# Patient Record
Sex: MALE | Employment: FULL TIME | URBAN - METROPOLITAN AREA
[De-identification: names, ages, dates, MRNs, and addresses within clinical notes are randomized per-mention and may not be internally consistent; named-entity substitution may affect disease eponyms.]

---

## 2020-04-30 ENCOUNTER — TELEPHONE (OUTPATIENT)
Dept: ORTHOPEDICS | Facility: CLINIC | Age: 60
End: 2020-04-30

## 2020-04-30 NOTE — TELEPHONE ENCOUNTER
L/M via voicemail.  Phoned patient to complete chart review.  Requested a return call. Direct phone number provided.

## 2020-05-12 ENCOUNTER — TELEPHONE (OUTPATIENT)
Dept: ORTHOPEDICS | Facility: CLINIC | Age: 60
End: 2020-05-12

## 2020-05-12 RX ORDER — DICLOFENAC SODIUM 75 MG/1
75 TABLET, DELAYED RELEASE ORAL 2 TIMES DAILY
COMMUNITY
Start: 2020-04-28

## 2020-05-12 RX ORDER — HYDROCHLOROTHIAZIDE 25 MG/1
25 TABLET ORAL DAILY
COMMUNITY
Start: 2020-02-25

## 2020-05-12 RX ORDER — PAROXETINE HYDROCHLORIDE 20 MG/1
20 TABLET, FILM COATED ORAL DAILY
COMMUNITY
Start: 2020-04-01

## 2020-05-12 RX ORDER — GLUCOSAM/CHONDRO/HERB 149/HYAL 750-100 MG
1000 TABLET ORAL DAILY
COMMUNITY
Start: 2014-05-09

## 2020-05-12 RX ORDER — LEVOTHYROXINE SODIUM 125 UG/1
125 TABLET ORAL DAILY
COMMUNITY
Start: 2017-12-29

## 2020-05-12 RX ORDER — POTASSIUM CITRATE 10 MEQ/1
10 TABLET, EXTENDED RELEASE ORAL 2 TIMES DAILY
COMMUNITY
Start: 2017-10-11

## 2020-05-12 RX ORDER — CALCIUM CARB/VITAMIN D3/VIT K1 500-500-40
TABLET,CHEWABLE ORAL DAILY
COMMUNITY
Start: 2014-05-09

## 2020-05-12 RX ORDER — CYCLOBENZAPRINE HCL 10 MG
10 TABLET ORAL DAILY PRN
COMMUNITY
Start: 2019-03-28

## 2020-05-12 NOTE — TELEPHONE ENCOUNTER
Spoke with patient, chart review completed, informed of dental clearance, standing xray's and labs needed.  Writer provided P's phone number to patient.    Alternative phone number: Magdalena, wife [phone]    Patient reported Ht & Wt:  510 @ 215    Medications verified: incl. supplements:  See Epic    Describes Overall health as: Fair    PMHX:  Date Unknown Ankylosing spondylitis   Date Unknown Anxiety disorder   Date Unknown Benign prostatic hyperplasia   Date Unknown Drug-induced lupus erythematosus    Date Unknown Hypercalciuria   Date Unknown Hypocitraturia   Date Unknown Hypothyroidism   Date Unknown Iritis   Date Unknown Kidney stone   Date Unknown Panic disorder   Date Unknown Vitamin D deficiency     LABS:         Pertinent Negatives:  Heart Disease - Denies, Lung Disease - Denies, Diabetes - Denies, Acute Cardiac conditions? Denies    METS  Can you take 1 flight of stairs? I can go up but not down    RCRI   Coronary Artery Disease - Denies   Congestive Heart Failure - Denies   Cerebrovascular Disease - Denies   Diabetes Mellitus on Insulin? Denies   Serum Creatinine >2 mg/dl or >177 ìmol/L?  Labs requested    ROS   Fever/Chills? Denies   Infection? Denies   Dental? Clearance needed   Sudden vision changes? Denies   Cough, phlegm, resp. problems? Denies   Bowel movements - regular? yes   Bleeding: BM, Urinary, Vaginal, Vomiting? Denies   UTI: Dysuria, Staph Infection? Denies   Recent steroid treatment? Denies   Stroke, passing out? Denies   Blood Thinners or Aspirin? Denies   Psychiatric hx: Depression, Anxiety? Anxiety    STOPBANG   S - Snore loudly - yes, sleep study performed 20 years ago, inconclusive   T - Tiredness during the day - oh yeah   O - Stop breathing in sleep - Denies   P - BP   B - BMI -    A - Over 50? Age 61 y/o   N - Neck size   G - male gender    PMX  Blood clot in heart or lung? Denies  Stents? Denies  Problems with anesthesia, bleeding, heart, sick  in the stomach? Denies    Family hx:  Anesthesia problems? Denies  Heart trouble? PGM - open heart surgery     Venous Thrombosis? Denies  Sexual history if have a period? (? pregnancy) N/A    Social   Works @ [employer], Job: His wife works at [employer]   Who do you live with? WifeMagdalena   Who will assist w/ surgery? WifeMagdalena